# Patient Record
Sex: MALE | Race: OTHER | HISPANIC OR LATINO | URBAN - METROPOLITAN AREA
[De-identification: names, ages, dates, MRNs, and addresses within clinical notes are randomized per-mention and may not be internally consistent; named-entity substitution may affect disease eponyms.]

---

## 2024-04-05 ENCOUNTER — EMERGENCY (EMERGENCY)
Facility: HOSPITAL | Age: 44
LOS: 0 days | Discharge: HOME | End: 2024-04-05
Attending: EMERGENCY MEDICINE
Payer: COMMERCIAL

## 2024-04-05 VITALS
OXYGEN SATURATION: 97 % | TEMPERATURE: 98 F | RESPIRATION RATE: 18 BRPM | HEART RATE: 79 BPM | DIASTOLIC BLOOD PRESSURE: 70 MMHG | HEIGHT: 67 IN | WEIGHT: 160.06 LBS | SYSTOLIC BLOOD PRESSURE: 128 MMHG

## 2024-04-05 VITALS
HEART RATE: 75 BPM | RESPIRATION RATE: 17 BRPM | OXYGEN SATURATION: 96 % | TEMPERATURE: 98 F | SYSTOLIC BLOOD PRESSURE: 123 MMHG | DIASTOLIC BLOOD PRESSURE: 79 MMHG

## 2024-04-05 DIAGNOSIS — S80.01XA CONTUSION OF RIGHT KNEE, INITIAL ENCOUNTER: ICD-10-CM

## 2024-04-05 DIAGNOSIS — Z23 ENCOUNTER FOR IMMUNIZATION: ICD-10-CM
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
## 2024-04-05 DIAGNOSIS — S50.12XA CONTUSION OF LEFT FOREARM, INITIAL ENCOUNTER: ICD-10-CM
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
## 2024-04-05 PROCEDURE — 90471 IMMUNIZATION ADMIN: CPT

## 2024-04-05 PROCEDURE — 73564 X-RAY EXAM KNEE 4 OR MORE: CPT | Mod: RT

## 2024-04-05 PROCEDURE — 99283 EMERGENCY DEPT VISIT LOW MDM: CPT | Mod: 25

## 2024-04-05 PROCEDURE — 73564 X-RAY EXAM KNEE 4 OR MORE: CPT | Mod: 26,RT

## 2024-04-05 PROCEDURE — 99284 EMERGENCY DEPT VISIT MOD MDM: CPT

## 2024-04-05 PROCEDURE — 90715 TDAP VACCINE 7 YRS/> IM: CPT

## 2024-04-05 RX ORDER — TETANUS TOXOID, REDUCED DIPHTHERIA TOXOID AND ACELLULAR PERTUSSIS VACCINE, ADSORBED 5; 2.5; 8; 8; 2.5 [IU]/.5ML; [IU]/.5ML; UG/.5ML; UG/.5ML; UG/.5ML
0.5 SUSPENSION INTRAMUSCULAR ONCE
Refills: 0 | Status: COMPLETED | OUTPATIENT
Start: 2024-04-05 | End: 2024-04-05

## 2024-04-05 RX ORDER — ACETAMINOPHEN 500 MG
975 TABLET ORAL ONCE
Refills: 0 | Status: COMPLETED | OUTPATIENT
Start: 2024-04-05 | End: 2024-04-05

## 2024-04-05 RX ADMIN — Medication 975 MILLIGRAM(S): at 21:15

## 2024-04-05 RX ADMIN — TETANUS TOXOID, REDUCED DIPHTHERIA TOXOID AND ACELLULAR PERTUSSIS VACCINE, ADSORBED 0.5 MILLILITER(S): 5; 2.5; 8; 8; 2.5 SUSPENSION INTRAMUSCULAR at 20:25

## 2024-04-05 NOTE — ED STATDOCS - NSFOLLOWUPINSTRUCTIONS_ED_ALL_ED_FT
Esguince de rodilla    LO QUE NECESITA SABER:    Un esguince de rodilla es un estiramiento o desgarro de un ligamento en la rodilla. Los ligamentos sostienen la rodilla y mantienen los huesos y la articulación en la posición correcta. Un esguince de rodilla podría involucrar presley o más ligamentos.  Anatomía de la rodilla    INSTRUCCIONES SOBRE EL TERRY HOSPITALARIA:    Busque atención médica de inmediato si:    Alguna parte de busby pierna se siente fría, entumecida o se ve pálida.    Llame a busby médico si:    Usted tiene nueva o mayor inflamación, moretones o dolor en busby rodilla.    Los síntomas no mejoran dentro de las 6 semanas, incluso con tratamiento.    Usted tiene preguntas o inquietudes acerca de busby condición o cuidado.  Medicamentos:    AINEcomo el ibuprofeno, ayudan a disminuir la inflamación, el dolor y la fiebre. Jeanne medicamento está disponible con o sin claire receta médica. Los AN pueden causar sangrado estomacal o problemas renales en ciertas personas. Si usted soco un medicamento anticoagulante, siempre pregúntele a busby médico si los AN son seguros para usted. Siempre marily la etiqueta de jeanne medicamento y siga las instrucciones.    Acetaminofénalivia el dolor y baja la fiebre. Está disponible sin receta médica. Pregunte la cantidad y la frecuencia con que debe tomarlos. Siga las indicaciones. Marily las etiquetas de todos los demás medicamentos que esté usando para saber si también contienen acetaminofén, o pregunte a busby médico o farmacéutico. El acetaminofén puede causar daño en el hígado cuando no se soco de forma correcta.    Puede administrarsepodrían administrarse. Pregunte al médico cómo debe joe jeanne medicamento de forma adams. Algunos medicamentos recetados para el dolor contienen acetaminofén. No tome otros medicamentos que contengan acetaminofén sin consultarlo con busby médico. Demasiado acetaminofeno puede causar daño al hígado. Los medicamentos recetados para el dolor podrían causar estreñimiento. Pregunte a busby médico wyatt prevenir o tratar estreñimiento.    [place] annie medicamentos wyatt se le haya indicado.Consulte con busby médico si usted evelyn que busby medicamento no le está ayudando o si presenta efectos secundarios. Infórmele al médico si usted es alérgico a algún medicamento. Mantenga claire lista actualizada de los medicamentos, las vitaminas y los productos herbales que soco. Incluya los siguientes datos de los medicamentos: cantidad, frecuencia y motivo de administración. Traiga con usted la lista o los envases de las píldoras a annie citas de seguimiento. Lleve la lista de los medicamentos con usted en darwin de claire emergencia.  Un dispositivo de apoyocomo claire férula o claire abrazadera podrían ser necesarios. Estos dispositivos limitan el movimiento y protegen la articulación mientras addy. Es posible que le den unas muletas para que las use hasta que pueda pararse sobre busby pierna lesionada sin dolor.    La fisioterapiapodría ser necesario. Un fisioterapeuta le puede enseñar ejercicios para ayudarle a mejorar el movimiento y la fuerza, y para disminuir el dolor.    Controle un esguince de rodilla:    Descansesu rodilla y no darwin ejercicio. No camine sobre la pierna lesionada si se le pide que mantenga el peso fuera de la rodilla. El reposo ayuda a disminuir la inflamación y permite que la lesión sane. Usted puede hacer ejercicios suaves de rango de movimiento wyatt se le indique para prevenir la rigidez.    Aplique hieloen la rodilla de 15 a 20 minutos cada hora o wyatt se le indique. Use claire compresa de hielo o ponga hielo triturado en claire bolsa de plástico. Cubra la bolsa con claire toalla antes de aplicarlo. El hielo ayuda a evitar daño al tejido y a disminuir la inflamación y el dolor.    Aplique compresiónen la rodilla según indicaciones. Es probable que necesite usar claire venda elástica. La venda elástica ayuda a evitar que busby rodilla lesionada se mueva demasiado mientras addy. Debería estar lo suficientemente ajustada para brindarle soporte tom no ajustada wyatt para que le provoque entumecimiento o sensación de hormigueo en los dedos de los pies. Quítese el vendaje y vuelva a colocarla 1 vez al día.  Cómo colocar claire venda elástica      Eleve busby rodillapor encima del nivel del corazón con la frecuencia que pueda. [employer] va a disminuir inflamación y el dolor. Coloque busby pierna sobre almohadas o cobijas para mantenerla elevada cómodamente. No coloque almohadas directamente detrás de busby rodilla.  Elevar la pierna  Evite otro esguince de rodilla:Ejercite annie piernas para mantener annie músculos ruth. Los músculos ruth de las piernas ayudan a proteger busby rodilla y a evitar un esguince. Lo siguiente también podría evitar un esguince de rodilla:    Comience lentamente annie ejercicios o busby programa de entrenamiento.Aumente lentamente el tiempo, la distancia y la intensidad del ejercicio. Los aumentos repentinos en el entrenamiento pueden causar otro esguince de rodilla.    Use dispositivos y equipo de protección wyatt se le indique.Las abrazaderas podrían evitar que la rodilla se mueva en la dirección incorrecta y provoque otro esguince. El equipo de protección podría sostener annie huesos y ligamentos para evitar claire lesión.    Entre en calor y estírese antes de hacer ejercicio.Antes de comenzar con busby ejercicio habitual, darwin movimientos de calentamiento, wyatt caminar o pedalear en claire bicicleta estática. Después de calentarse darwin estiramientos suaves. [employer] ayuda a aflojar annie músculos y a disminuir el estrés en busby rodilla. Después de ejercitarse, refrésquese y darwin estiramientos.  Calentamiento y enfriamiento      Use zapatos que le calcen dom y que sujeten annie pies.Reemplace annie zapatos de ejercicio o de correr, antes que el acolchado o el amortiguador de golpes esté desgastado. Pregúntele a busby médico cuáles zapatos deportivos son más convenientes para usted. Pregunte si debería usar plantillas en annie zapatos. Las plantillas pueden ayudar a apoyar annie talones y bunny, o a mantener alineados correctamente annie pies dentro de los zapatos. Ejercítese sobre superficies polo.  Acuda a la consulta de control con busby médico según las indicaciones:Anote annie preguntas para que se acuerde de hacerlas melania annie visitas.

## 2024-04-05 NOTE — ED ADULT NURSE NOTE - IN THE PAST 12 MONTHS HAVE YOU USED DRUGS OTHER THAN THOSE REQUIRED FOR MEDICAL REASON?
No
What Type Of Note Output Would You Prefer (Optional)?: Standard Output
How Severe Is Your Skin Lesion?: mild
Has Your Skin Lesion Been Treated?: not been treated
Is This A New Presentation, Or A Follow-Up?: Growth
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
## 2024-04-05 NOTE — ED ADULT NURSE NOTE - NSFALLUNIVINTERV_ED_ALL_ED
Bed/Stretcher in lowest position, wheels locked, appropriate side rails in place/Call bell, personal items and telephone in reach/Instruct patient to call for assistance before getting out of bed/chair/stretcher/Non-slip footwear applied when patient is off stretcher/[place] to call system/Physically safe environment - no spills, clutter or unnecessary equipment/Purposeful proactive rounding/Room/bathroom lighting operational, light cord in reach
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
## 2024-04-05 NOTE — ED STATDOCS - PROGRESS NOTE DETAILS
Stable on reassessment.  Ambulatory without deficit.  Pain controlled.  Patient to follow-up primary care physician. -DO Lakhwinder
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
## 2024-04-05 NOTE — ED STATDOCS - PATIENT PORTAL LINK FT
You can access the FollowMyHealth Patient Portal offered by [facility] by registering at the following website: [email or web address]. By joining [email or web address]’s FollowMyHealth portal, you will also be able to view your health information using other applications (apps) compatible with our system.
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
